# Patient Record
Sex: MALE | URBAN - METROPOLITAN AREA
[De-identification: names, ages, dates, MRNs, and addresses within clinical notes are randomized per-mention and may not be internally consistent; named-entity substitution may affect disease eponyms.]

---

## 2020-09-03 ENCOUNTER — APPOINTMENT (RX ONLY)
Dept: URBAN - METROPOLITAN AREA CLINIC 11 | Facility: CLINIC | Age: 35
Setting detail: DERMATOLOGY
End: 2020-09-03

## 2020-09-03 DIAGNOSIS — D48.5 NEOPLASM OF UNCERTAIN BEHAVIOR OF SKIN: ICD-10-CM

## 2020-09-03 PROCEDURE — ? ORDER FOR SURGERY

## 2020-09-03 PROCEDURE — 99202 OFFICE O/P NEW SF 15 MIN: CPT

## 2020-09-03 PROCEDURE — ? COUNSELING - NEOPLASM OF UNCERTAIN BEHAVIOR

## 2020-09-03 ASSESSMENT — LOCATION DETAILED DESCRIPTION DERM
LOCATION DETAILED: PHILTRUM
LOCATION DETAILED: PHILTRUM

## 2020-09-03 ASSESSMENT — LOCATION SIMPLE DESCRIPTION DERM
LOCATION SIMPLE: UPPER LIP
LOCATION SIMPLE: UPPER LIP

## 2020-09-03 ASSESSMENT — LOCATION ZONE DERM
LOCATION ZONE: LIP
LOCATION ZONE: LIP

## 2020-09-03 NOTE — PROCEDURE: ORDER FOR SURGERY
Admission Status: outpatient
Priority: normal
Cosmetic, Major Or Minor?: Major (Insurance, 90-day global)
Time Frame Unit: day(s)
Audit Log: 0
Facility: in office
Provider: Deng Hamm MD
Surgeon: Dr. Hamm
Detail Level: Simple
Surgery To Be Ordered: Simple punch biopsy of philtrum

## 2020-09-18 ENCOUNTER — APPOINTMENT (RX ONLY)
Dept: URBAN - METROPOLITAN AREA CLINIC 11 | Facility: CLINIC | Age: 35
Setting detail: DERMATOLOGY
End: 2020-09-18

## 2020-09-18 DIAGNOSIS — D48.5 NEOPLASM OF UNCERTAIN BEHAVIOR OF SKIN: ICD-10-CM

## 2020-09-18 PROCEDURE — ? PUNCH EXCISION

## 2020-09-18 PROCEDURE — 11440 EXC FACE-MM B9+MARG 0.5 CM/<: CPT

## 2020-09-18 ASSESSMENT — LOCATION SIMPLE DESCRIPTION DERM: LOCATION SIMPLE: UPPER LIP

## 2020-09-18 ASSESSMENT — LOCATION ZONE DERM: LOCATION ZONE: LIP

## 2020-09-18 ASSESSMENT — LOCATION DETAILED DESCRIPTION DERM: LOCATION DETAILED: PHILTRUM

## 2020-09-18 NOTE — PROCEDURE: PUNCH EXCISION
Anesthesia Type: 1% lidocaine with epinephrine and a 1:10 solution of 8.4% sodium bicarbonate
Notification Instructions: Patient will be notified of biopsy results. However, the patient was instructed to call the office if not contacted within 2 weeks.
Surgeon: Dr. Hamm
Estimated Blood Loss (Cc): scant
Consent: Informed consent was obtained from the patient. The risks, benefits, expectations and alternatives to therapy were discussed in detail. Specifically, the risks of infection, scarring, bleeding, prolonged wound healing, incomplete removal, allergy to anesthesia, nerve injury and recurrence were addressed. Prior to the procedure, the treatment site was clearly identified and confirmed by the patient. All components of Universal Protocol/PAUSE Rule completed.
Anesthesia Volume In Cc: 0.3
Detail Level: Detailed
Bill For Surgical Tray: no
Size Of Lesion (*Required): 0.1
X Size Of Lesion Width In Cm (Optional): 0
Punch Size In Mm: 2
Layered Repair: None
Intermediate / Complex Repair - Final Wound Length In Cm: 0.2
Hemostasis: pressure
Epidermal Sutures: 6-0 Ethilon
Epidermal Closure: simple interrupted
Suture Removal: 7 days
Lab: [id number]
Medical Necessity Clause: This procedure was medically necessary because the lesion that was treated was:
Lab Facility: [id number]

## 2020-09-25 ENCOUNTER — APPOINTMENT (RX ONLY)
Dept: URBAN - METROPOLITAN AREA CLINIC 11 | Facility: CLINIC | Age: 35
Setting detail: DERMATOLOGY
End: 2020-09-25

## 2020-09-25 DIAGNOSIS — Z48.89 ENCOUNTER FOR OTHER SPECIFIED SURGICAL AFTERCARE: ICD-10-CM

## 2020-09-25 PROCEDURE — ? SUTURE REMOVAL

## 2020-09-25 PROCEDURE — ? COUNSELING - POST-OP CHECK

## 2020-09-25 PROCEDURE — 99024 POSTOP FOLLOW-UP VISIT: CPT

## 2020-09-25 ASSESSMENT — LOCATION DETAILED DESCRIPTION DERM: LOCATION DETAILED: PHILTRUM

## 2020-09-25 ASSESSMENT — LOCATION ZONE DERM: LOCATION ZONE: LIP

## 2020-09-25 ASSESSMENT — LOCATION SIMPLE DESCRIPTION DERM: LOCATION SIMPLE: UPPER LIP
